# Patient Record
Sex: FEMALE | Race: WHITE | NOT HISPANIC OR LATINO | Employment: PART TIME | URBAN - METROPOLITAN AREA
[De-identification: names, ages, dates, MRNs, and addresses within clinical notes are randomized per-mention and may not be internally consistent; named-entity substitution may affect disease eponyms.]

---

## 2023-09-07 ENCOUNTER — LAB REQUISITION (OUTPATIENT)
Dept: LAB | Facility: CLINIC | Age: 48
End: 2023-09-07
Payer: MEDICARE

## 2023-09-07 DIAGNOSIS — Z01.419 ENCOUNTER FOR GYNECOLOGICAL EXAMINATION (GENERAL) (ROUTINE) WITHOUT ABNORMAL FINDINGS: ICD-10-CM

## 2023-09-07 PROCEDURE — 87624 HPV HI-RISK TYP POOLED RSLT: CPT | Mod: ORL | Performed by: OBSTETRICS & GYNECOLOGY

## 2023-09-07 PROCEDURE — G0145 SCR C/V CYTO,THINLAYER,RESCR: HCPCS | Mod: ORL | Performed by: OBSTETRICS & GYNECOLOGY

## 2023-09-12 LAB
BKR LAB AP GYN ADEQUACY: NORMAL
BKR LAB AP GYN INTERPRETATION: NORMAL
BKR LAB AP HPV REFLEX: NORMAL
BKR LAB AP LMP: NORMAL
BKR LAB AP PREVIOUS ABNL DX: NORMAL
BKR LAB AP PREVIOUS ABNORMAL: NORMAL
PATH REPORT.COMMENTS IMP SPEC: NORMAL
PATH REPORT.COMMENTS IMP SPEC: NORMAL
PATH REPORT.RELEVANT HX SPEC: NORMAL

## 2023-09-14 LAB
HUMAN PAPILLOMA VIRUS 16 DNA: NEGATIVE
HUMAN PAPILLOMA VIRUS 18 DNA: NEGATIVE
HUMAN PAPILLOMA VIRUS FINAL DIAGNOSIS: NORMAL
HUMAN PAPILLOMA VIRUS OTHER HR: NEGATIVE

## 2024-05-13 ENCOUNTER — TELEPHONE (OUTPATIENT)
Dept: WOUND CARE | Facility: CLINIC | Age: 49
End: 2024-05-13
Payer: MEDICARE

## 2024-05-13 NOTE — TELEPHONE ENCOUNTER
Received message from patient requesting visit in the stoma clinic.  She was referred by Arabella nurse practitioner at [facility].  I returned patient's call and left message that I am not able to see referrals from [facility] only.  The referral needs to come from a [facility] physician.

## 2024-05-20 DIAGNOSIS — N31.9 NEUROGENIC BLADDER: Primary | ICD-10-CM

## 2024-06-20 ENCOUNTER — OFFICE VISIT (OUTPATIENT)
Dept: WOUND CARE | Facility: CLINIC | Age: 49
End: 2024-06-20
Payer: MEDICARE

## 2024-06-20 DIAGNOSIS — Z93.6 S/P ILEAL CONDUIT (H): Primary | ICD-10-CM

## 2024-06-20 DIAGNOSIS — Q05.9 SPINA BIFIDA (H): ICD-10-CM

## 2024-06-20 DIAGNOSIS — N31.9 NEUROGENIC BLADDER: ICD-10-CM

## 2024-06-20 PROCEDURE — 99211 OFF/OP EST MAY X REQ PHY/QHP: CPT

## 2024-06-20 NOTE — PATIENT INSTRUCTIONS
"Continue with current system for now:   Precut not available so cut yourself just slightly bigger then starter hole to 16 mm or 5/8\"  Belt 7300  Ingris ring [id number]    Option to order: One piece precut to 16mm  convex # [id number]  Also use with belt and Ingris ring    I will request [employer] deep convex pouches  for samples from [employer]      "

## 2024-06-20 NOTE — PROGRESS NOTES
"[facility] Ostomy Assessment  Patient comes to clinic for consultation regarding ostomy issues.    Procedure: pt with Spina bifida, urostomy since the age of 1 at [place] by dr Cotton  Dx related to ostomy:  Consulted per Dr Dana Cotton    Subjective:She is here with motherand ostomy care is provided by self   Patient's reason for visit: \"skin breakdown around urostomy\"     The quantity of ostomy supplies needed by a beneficiary is determined primarily by the type of ostomy, its location, its construction, and the condition of the skin surface surrounding the stoma.    Objective:  Type: Ileal Conduit since 1975  Stoma: 16 mm  end viable, round, and flat   Location: right lower quadrant     Peristomal skin: erythema and pink raised lesions present  and barrier is showing some signs of leakage circumferential    Frequency of pouch change: three times weekly. This is scheduled.   Ordering supplies from:  [employer], Fax: [phone],Customer Service: [phone]    Current pouch system/supplies: Alonos   two piece, cut to fit, and convex    Assessment: Patient was seen at [place] and had mentioned more frequent redness or changing skin around the urostomy.  Urostomy is quite small.  She cuts the barrier to 1 inch which is too large.  She does not use a colloid ring currently.  I spoken to her her on the phone a few weeks ago and recommended to change the pouch 3 times a week rather than 2 times a week.  Patient states that this has improved her skin.     Intervention/Plan: The following recommendations were made; Continue with current system for now:   Precut not available so cut yourself just slightly bigger then starter hole to 16 mm or 5/8\"  Belt 7300  Ingris ring [id number]    Option to order: One piece precut to 16mm  convex # 00685  Also use with belt and Ingris ring  I will request [employer] deep convex pouches  for samples from [employer]    Return to clinic prn     Dr You was available for supervision " of care if needed or if questions should arise and regarding plan of care. Marilyn Marie, RN  RN [facility]

## 2025-05-08 ENCOUNTER — TRANSFERRED RECORDS (OUTPATIENT)
Dept: HEALTH INFORMATION MANAGEMENT | Facility: CLINIC | Age: 50
End: 2025-05-08
Payer: MEDICARE

## 2025-05-13 ENCOUNTER — TRANSFERRED RECORDS (OUTPATIENT)
Dept: HEALTH INFORMATION MANAGEMENT | Facility: CLINIC | Age: 50
End: 2025-05-13
Payer: MEDICARE

## 2025-05-20 ENCOUNTER — MEDICAL CORRESPONDENCE (OUTPATIENT)
Dept: HEALTH INFORMATION MANAGEMENT | Facility: CLINIC | Age: 50
End: 2025-05-20
Payer: MEDICARE

## 2025-05-20 DIAGNOSIS — T85.858A STENOSIS OF ILEAL CONDUIT STOMA, INITIAL ENCOUNTER: Primary | ICD-10-CM

## 2025-05-21 ENCOUNTER — TRANSCRIBE ORDERS (OUTPATIENT)
Dept: OTHER | Age: 50
End: 2025-05-21

## 2025-05-21 ENCOUNTER — TELEPHONE (OUTPATIENT)
Dept: UROLOGY | Facility: CLINIC | Age: 50
End: 2025-05-21
Payer: MEDICARE

## 2025-05-21 DIAGNOSIS — T85.858A ILEAL CONDUIT STOMAL STENOSIS: Primary | ICD-10-CM

## 2025-05-21 RX ORDER — ACETAMINOPHEN 325 MG/1
975 TABLET ORAL ONCE
OUTPATIENT
Start: 2025-05-21 | End: 2025-05-21

## 2025-05-21 RX ORDER — HEPARIN SODIUM 10000 [USP'U]/ML
5000 INJECTION, SOLUTION INTRAVENOUS; SUBCUTANEOUS
OUTPATIENT
Start: 2025-05-21

## 2025-05-21 NOTE — TELEPHONE ENCOUNTER
Left message for patient regarding scheduling surgery with Dr. Cotton in October.      Direct call back number given  Ph: [phone]      Juan Juan on 5/21/2025 at 3:11 PM

## 2025-05-21 NOTE — PROGRESS NOTES
50 y/o woman with SB who has cystectomy and ileal conduit as a child. Has stenotic stoma. No skin irritation, ostomy appliance fitting problems or hydro. But does have long segment narrowing on CT and loopogram.     Plan above and below fascia (robotic) revision with harvest of new ileum, spare proximal stump.

## 2025-05-22 ENCOUNTER — PATIENT OUTREACH (OUTPATIENT)
Dept: CARE COORDINATION | Facility: CLINIC | Age: 50
End: 2025-05-22
Payer: MEDICARE

## 2025-05-22 NOTE — TELEPHONE ENCOUNTER
Patient left voicemail requesting to schedule surgery with Dr. Cotton for the last week of October.       Juan Juan on 5/22/2025 at 10:12 AM

## 2025-05-26 ENCOUNTER — PATIENT OUTREACH (OUTPATIENT)
Dept: CARE COORDINATION | Facility: CLINIC | Age: 50
End: 2025-05-26
Payer: MEDICARE

## 2025-06-23 ENCOUNTER — TELEPHONE (OUTPATIENT)
Dept: UROLOGY | Facility: CLINIC | Age: 50
End: 2025-06-23
Payer: MEDICARE

## 2025-06-23 NOTE — TELEPHONE ENCOUNTER
Patient called requesting to move up surgery on 11/5 with Dr. Cotton to September as her stoma is bothering her more lately.     Spoke with: Patient     Date of Surgery: 10/01/2025 - next available     Estimated Arrival time Discussed with Patient:  No    Location of surgery: [facility]     Pre-Op H&P: PAC 9/18 at 10AM    Labs: No     Imaging: No     Post-Op Appt Date: Dr. Cotton  11/3 at 10:30AM    Post-Op Imaging Date:  No     Discussed with patient PAC RN will provide arrival time and instructions for surgery at the time of the appointment: [[facility] only]: Yes    Standard Surgery Packet Sent: Yes 06/23/25  via Mail - Standard      Additional Comments: Patient requested to be added to cancellation list for September.       All patients questions were answered and was instructed to review surgical packet and call back with any questions or concerns.       Juan Juan on 6/23/2025 at 10:06 AM

## 2025-07-05 ENCOUNTER — HEALTH MAINTENANCE LETTER (OUTPATIENT)
Age: 50
End: 2025-07-05

## 2025-08-04 ENCOUNTER — TELEPHONE (OUTPATIENT)
Dept: UROLOGY | Facility: CLINIC | Age: 50
End: 2025-08-04
Payer: MEDICARE

## 2025-08-28 DIAGNOSIS — R39.89 SUSPECTED UTI: Primary | ICD-10-CM
